# Patient Record
Sex: FEMALE | Race: WHITE | Employment: FULL TIME | URBAN - METROPOLITAN AREA
[De-identification: names, ages, dates, MRNs, and addresses within clinical notes are randomized per-mention and may not be internally consistent; named-entity substitution may affect disease eponyms.]

---

## 2020-10-23 ENCOUNTER — HOSPITAL ENCOUNTER (EMERGENCY)
Age: 30
Discharge: HOME | End: 2020-10-23
Attending: EMERGENCY MEDICINE
Payer: MEDICAID

## 2020-10-23 VITALS
BODY MASS INDEX: 35.36 KG/M2 | RESPIRATION RATE: 16 BRPM | OXYGEN SATURATION: 96 % | WEIGHT: 220 LBS | DIASTOLIC BLOOD PRESSURE: 79 MMHG | TEMPERATURE: 98.5 F | HEIGHT: 66 IN | HEART RATE: 73 BPM | SYSTOLIC BLOOD PRESSURE: 122 MMHG

## 2020-10-23 DIAGNOSIS — R31.9 URINARY TRACT INFECTION WITH HEMATURIA, SITE UNSPECIFIED: Primary | ICD-10-CM

## 2020-10-23 DIAGNOSIS — N39.0 URINARY TRACT INFECTION WITH HEMATURIA, SITE UNSPECIFIED: Primary | ICD-10-CM

## 2020-10-23 DIAGNOSIS — N92.0 MENORRHAGIA WITH REGULAR CYCLE: ICD-10-CM

## 2020-10-23 LAB
ALBUMIN SERPL-MCNC: 4.1 G/DL (ref 3.5–5)
ALBUMIN/GLOB SERPL: 1.1 {RATIO} (ref 1.1–2.2)
ALP SERPL-CCNC: 59 U/L (ref 45–117)
ALT SERPL-CCNC: 182 U/L (ref 12–78)
ANION GAP SERPL CALC-SCNC: 10 MMOL/L (ref 5–15)
APPEARANCE UR: CLEAR
AST SERPL W P-5'-P-CCNC: 70 U/L (ref 15–37)
BACTERIA URNS QL MICRO: ABNORMAL /HPF
BASOPHILS # BLD: 0 K/UL (ref 0–0.1)
BASOPHILS NFR BLD: 0 % (ref 0–1)
BILIRUB SERPL-MCNC: 1 MG/DL (ref 0.2–1)
BILIRUB UR QL: ABNORMAL
BUN SERPL-MCNC: 13 MG/DL (ref 6–20)
BUN/CREAT SERPL: 18 (ref 12–20)
CA-I BLD-MCNC: 8.9 MG/DL (ref 8.5–10.1)
CHLORIDE SERPL-SCNC: 103 MMOL/L (ref 97–108)
CO2 SERPL-SCNC: 26 MMOL/L (ref 21–32)
COLOR UR: ABNORMAL
CREAT SERPL-MCNC: 0.72 MG/DL (ref 0.55–1.02)
DIFFERENTIAL METHOD BLD: ABNORMAL
EOSINOPHIL # BLD: 0.1 K/UL (ref 0–0.4)
EOSINOPHIL NFR BLD: 1 % (ref 0–7)
EPITH CASTS URNS QL MICRO: ABNORMAL /LPF
ERYTHROCYTE [DISTWIDTH] IN BLOOD BY AUTOMATED COUNT: 11.1 % (ref 11.5–14.5)
GLOBULIN SER CALC-MCNC: 3.9 G/DL (ref 2–4)
GLUCOSE SERPL-MCNC: 85 MG/DL (ref 65–100)
GLUCOSE UR STRIP.AUTO-MCNC: NEGATIVE MG/DL
HCG UR QL: NEGATIVE
HCT VFR BLD AUTO: 36.8 % (ref 35–47)
HGB BLD-MCNC: 13.1 G/DL (ref 11.5–16)
HGB UR QL STRIP: ABNORMAL
IMM GRANULOCYTES # BLD AUTO: 0 K/UL (ref 0–0.04)
IMM GRANULOCYTES NFR BLD AUTO: 0 % (ref 0–0.5)
KETONES UR QL STRIP.AUTO: >80 MG/DL
LEUKOCYTE ESTERASE UR QL STRIP.AUTO: ABNORMAL
LYMPHOCYTES # BLD: 1.4 K/UL (ref 0.8–3.5)
LYMPHOCYTES NFR BLD: 22 % (ref 12–49)
MCH RBC QN AUTO: 32.9 PG (ref 26–34)
MCHC RBC AUTO-ENTMCNC: 35.6 G/DL (ref 30–36.5)
MCV RBC AUTO: 92.5 FL (ref 80–99)
MONOCYTES # BLD: 0.5 K/UL (ref 0–1)
MONOCYTES NFR BLD: 7 % (ref 5–13)
NEUTS SEG # BLD: 4.4 K/UL (ref 1.8–8)
NEUTS SEG NFR BLD: 70 % (ref 32–75)
NITRITE UR QL STRIP.AUTO: POSITIVE
PH UR STRIP: 5 [PH] (ref 5–8)
PLATELET # BLD AUTO: 157 K/UL (ref 150–400)
PMV BLD AUTO: 11.5 FL (ref 8.9–12.9)
POTASSIUM SERPL-SCNC: 3.8 MMOL/L (ref 3.5–5.1)
PROT SERPL-MCNC: 8 G/DL (ref 6.4–8.2)
PROT UR STRIP-MCNC: ABNORMAL MG/DL
RBC # BLD AUTO: 3.98 M/UL (ref 3.8–5.2)
RBC #/AREA URNS HPF: ABNORMAL /HPF (ref 0–5)
SODIUM SERPL-SCNC: 139 MMOL/L (ref 136–145)
SP GR UR REFRACTOMETRY: 1.02 (ref 1–1.03)
UA: UC IF INDICATED,UAUC: ABNORMAL
UROBILINOGEN UR QL STRIP.AUTO: 1 EU/DL (ref 0.2–1)
WBC # BLD AUTO: 6.3 K/UL (ref 3.6–11)
WBC URNS QL MICRO: ABNORMAL /HPF (ref 0–4)

## 2020-10-23 PROCEDURE — 80053 COMPREHEN METABOLIC PANEL: CPT

## 2020-10-23 PROCEDURE — 81001 URINALYSIS AUTO W/SCOPE: CPT

## 2020-10-23 PROCEDURE — 81025 URINE PREGNANCY TEST: CPT

## 2020-10-23 PROCEDURE — 87086 URINE CULTURE/COLONY COUNT: CPT

## 2020-10-23 PROCEDURE — 87077 CULTURE AEROBIC IDENTIFY: CPT

## 2020-10-23 PROCEDURE — 99283 EMERGENCY DEPT VISIT LOW MDM: CPT

## 2020-10-23 PROCEDURE — 87186 SC STD MICRODIL/AGAR DIL: CPT

## 2020-10-23 PROCEDURE — 85025 COMPLETE CBC W/AUTO DIFF WBC: CPT

## 2020-10-23 RX ORDER — BUPRENORPHINE AND NALOXONE 8; 2 MG/1; MG/1
1 FILM, SOLUBLE BUCCAL; SUBLINGUAL DAILY
COMMUNITY

## 2020-10-23 RX ORDER — DEXTROAMPHETAMINE SACCHARATE, AMPHETAMINE ASPARTATE, DEXTROAMPHETAMINE SULFATE AND AMPHETAMINE SULFATE 5; 5; 5; 5 MG/1; MG/1; MG/1; MG/1
20 TABLET ORAL 3 TIMES DAILY
COMMUNITY

## 2020-10-23 RX ORDER — CEPHALEXIN 500 MG/1
500 CAPSULE ORAL 4 TIMES DAILY
Qty: 28 CAP | Refills: 0 | Status: SHIPPED | OUTPATIENT
Start: 2020-10-23 | End: 2020-10-30

## 2020-10-23 NOTE — DISCHARGE INSTRUCTIONS
Take your medicines as prescribed and follow-up with your PCP/OB/GYN in 2 to 3 days for further evaluation

## 2020-10-23 NOTE — ED TRIAGE NOTES
PT TO ED WITH C/O HEAVY VAGINAL BLEEDING THAT STARTED YESTERDAY. LMP 9/18/2020. VOMITED X1 TODAY. CRAMPING LOWER ABDOMEN/PEVIC REGION.

## 2020-10-24 NOTE — ED PROVIDER NOTES
EMERGENCY DEPARTMENT HISTORY AND PHYSICAL EXAM      Date: 10/23/2020  Patient Name: Kathryn Correa    History of Presenting Illness     Chief Complaint   Patient presents with    Vaginal Bleeding       History Provided By: Patient    HPI: Kathryn Correa, 35 y.o. female with a past medical history significant Scoliosis, ADHD, h/o opioid dependence presents to the ED with cc of recurrent menorrhagia for the last few days, no relieving factors. She has mild associated dysmenorrhea. No other constitutional symptoms. She has had 1 episode of vomiting. LMP 9/18/2020    There are no other complaints, changes, or physical findings at this time. PCP: UNKNOWN    No current facility-administered medications on file prior to encounter. Current Outpatient Medications on File Prior to Encounter   Medication Sig Dispense Refill    dextroamphetamine-amphetamine (ADDERALL) 20 mg tablet Take 20 mg by mouth three (3) times daily. Indications: attention deficit disorder with hyperactivity      buprenorphine-naloxone (Suboxone) 8-2 mg film sublingaul film 1 Film by SubLINGual route daily. Indications: dependence on opioid-type drugs         Past History     Past Medical History:  Past Medical History:   Diagnosis Date    Scoliosis        Past Surgical History:  History reviewed. No pertinent surgical history. Family History:  History reviewed. No pertinent family history. Social History:  Social History     Tobacco Use    Smoking status: Former Smoker     Types: Cigarettes    Smokeless tobacco: Never Used    Tobacco comment: QUIT 3 YEARS AGO   Substance Use Topics    Alcohol use: Yes    Drug use: No       Allergies:  No Known Allergies      Review of Systems     Review of Systems   Constitutional: Negative. HENT: Negative. Eyes: Negative. Respiratory: Negative. Cardiovascular: Negative. Gastrointestinal: Negative. Endocrine: Negative.     Genitourinary: Positive for menstrual problem and vaginal bleeding. Musculoskeletal: Negative. Skin: Negative. Neurological: Negative. Hematological: Negative. Psychiatric/Behavioral: Negative. All other systems reviewed and are negative. Physical Exam     Physical Exam  Vitals signs and nursing note reviewed. Constitutional:       Appearance: Normal appearance. She is normal weight. HENT:      Head: Normocephalic and atraumatic. Nose: Nose normal.      Mouth/Throat:      Mouth: Mucous membranes are moist.      Pharynx: Oropharynx is clear. Eyes:      Extraocular Movements: Extraocular movements intact. Conjunctiva/sclera: Conjunctivae normal.      Pupils: Pupils are equal, round, and reactive to light. Neck:      Musculoskeletal: Normal range of motion and neck supple. Cardiovascular:      Rate and Rhythm: Normal rate and regular rhythm. Pulses: Normal pulses. Heart sounds: Normal heart sounds. Pulmonary:      Effort: Pulmonary effort is normal.      Breath sounds: Normal breath sounds. Abdominal:      General: Abdomen is flat. Palpations: Abdomen is soft. Musculoskeletal: Normal range of motion. Skin:     General: Skin is warm and dry. Capillary Refill: Capillary refill takes less than 2 seconds. Neurological:      General: No focal deficit present. Mental Status: She is alert and oriented to person, place, and time.    Psychiatric:         Mood and Affect: Mood normal.         Behavior: Behavior normal.         Diagnostic Study Results     Labs -  Recent Results (from the past 24 hour(s))   URINALYSIS W/ REFLEX CULTURE    Collection Time: 10/23/20  3:00 PM    Specimen: Urine   Result Value Ref Range    Color Dark Yellow      Appearance Clear Clear      Specific gravity 1.020 1.003 - 1.030      pH (UA) 5.0 5.0 - 8.0      Protein Trace (A) Negative mg/dL    Glucose Negative Negative mg/dL    Ketone >80 (A) Negative mg/dL    Bilirubin Small (A) Negative      Blood Large (A) Negative Urobilinogen 1.0 0.2 - 1.0 EU/dL    Nitrites Positive (A) Negative      Leukocyte Esterase Large (A) Negative      WBC 10-20 0 - 4 /hpf    RBC 5-10 0 - 5 /hpf    Epithelial cells Moderate (A) Few /lpf    Bacteria 2+ (A) Negative /hpf    UA:UC IF INDICATED Urine Culture Ordered (A) Culture not indicated by UA result     HCG URINE, QL    Collection Time: 10/23/20  3:00 PM   Result Value Ref Range    HCG urine, QL Negative Negative     CBC WITH AUTOMATED DIFF    Collection Time: 10/23/20  3:30 PM   Result Value Ref Range    WBC 6.3 3.6 - 11.0 K/uL    RBC 3.98 3.80 - 5.20 M/uL    HGB 13.1 11.5 - 16.0 g/dL    HCT 36.8 35.0 - 47.0 %    MCV 92.5 80.0 - 99.0 FL    MCH 32.9 26.0 - 34.0 PG    MCHC 35.6 30.0 - 36.5 g/dL    RDW 11.1 (L) 11.5 - 14.5 %    PLATELET 357 607 - 438 K/uL    MPV 11.5 8.9 - 12.9 FL    NEUTROPHILS 70 32 - 75 %    LYMPHOCYTES 22 12 - 49 %    MONOCYTES 7 5 - 13 %    EOSINOPHILS 1 0 - 7 %    BASOPHILS 0 0 - 1 %    IMMATURE GRANULOCYTES 0 0.0 - 0.5 %    ABS. NEUTROPHILS 4.4 1.8 - 8.0 K/UL    ABS. LYMPHOCYTES 1.4 0.8 - 3.5 K/UL    ABS. MONOCYTES 0.5 0.0 - 1.0 K/UL    ABS. EOSINOPHILS 0.1 0.0 - 0.4 K/UL    ABS. BASOPHILS 0.0 0.0 - 0.1 K/UL    ABS. IMM. GRANS. 0.0 0.00 - 0.04 K/UL    DF AUTOMATED     METABOLIC PANEL, COMPREHENSIVE    Collection Time: 10/23/20  3:30 PM   Result Value Ref Range    Sodium 139 136 - 145 mmol/L    Potassium 3.8 3.5 - 5.1 mmol/L    Chloride 103 97 - 108 mmol/L    CO2 26 21 - 32 mmol/L    Anion gap 10 5 - 15 mmol/L    Glucose 85 65 - 100 mg/dL    BUN 13 6 - 20 mg/dL    Creatinine 0.72 0.55 - 1.02 mg/dL    BUN/Creatinine ratio 18 12 - 20      GFR est AA >60 >60 ml/min/1.73m2    GFR est non-AA >60 >60 ml/min/1.73m2    Calcium 8.9 8.5 - 10.1 mg/dL    Bilirubin, total 1.0 0.2 - 1.0 mg/dL    AST (SGOT) 70 (H) 15 - 37 U/L    ALT (SGPT) 182 (H) 12 - 78 U/L    Alk.  phosphatase 59 45 - 117 U/L    Protein, total 8.0 6.4 - 8.2 g/dL    Albumin 4.1 3.5 - 5.0 g/dL    Globulin 3.9 2.0 - 4.0 g/dL A-G Ratio 1.1 1.1 - 2.2       Radiologic Studies -     CT Results  (Last 48 hours)    None        CXR Results  (Last 48 hours)    None            Medical Decision Making   I am the first provider for this patient. I reviewed the vital signs, available nursing notes, past medical history, past surgical history, family history and social history. Vital Signs-Reviewed the patient's vital signs. No data found. Records Reviewed: Nursing Notes    Provider Notes (Medical Decision Making): Uneventful ED course, clinical improvement with therapy, patient will be discharged to followup with PCP as directed      ED Course:   Initial assessment performed. The patients presenting problems have been discussed, and they are in agreement with the care plan formulated and outlined with them. I have encouraged them to ask questions as they arise throughout their visit. PLAN:  1. Discharge Medication List as of 10/23/2020  4:58 PM      START taking these medications    Details   cephALEXin (Keflex) 500 mg capsule Take 1 Cap by mouth four (4) times daily for 7 days. , Normal, Disp-28 Cap,R-0         CONTINUE these medications which have NOT CHANGED    Details   dextroamphetamine-amphetamine (ADDERALL) 20 mg tablet Take 20 mg by mouth three (3) times daily. Indications: attention deficit disorder with hyperactivity, Historical Med      buprenorphine-naloxone (Suboxone) 8-2 mg film sublingaul film 1 Film by SubLINGual route daily. Indications: dependence on opioid-type drugs, Historical Med.           2.   Follow-up Information     Follow up With Specialties Details Why Contact Info    Follow-up PCP/OB/GYN of your choice  In 2 days For further evaluation         Return to ED if worse     Diagnosis     Clinical Impression:   1. Urinary tract infection with hematuria, site unspecified    2.  Menorrhagia with regular cycle      Patients ED visit today does not show an emergent medical condition that requires inpatient admission; The diagnosis, treatment plan and the need for follow up have been discussed in detail with the patient and he has been given the opportunity to ask questions. All such questions at this time, have been satisfactorily answered; patient remains in stable condition for discharge.

## 2020-10-26 LAB
BACTERIA SPEC CULT: ABNORMAL
BACTERIA SPEC CULT: ABNORMAL
COLONY COUNT,CNT: ABNORMAL
SPECIAL REQUESTS,SREQ: ABNORMAL

## 2023-12-04 LAB
ABO, EXTERNAL RESULT: NORMAL
C. TRACHOMATIS, EXTERNAL RESULT: NEGATIVE
HEP B, EXTERNAL RESULT: NEGATIVE
HIV, EXTERNAL RESULT: NON REACTIVE
N. GONORRHOEAE, EXTERNAL RESULT: NEGATIVE
RH FACTOR, EXTERNAL RESULT: POSITIVE
RPR, EXTERNAL RESULT: NON REACTIVE
RUBELLA TITER, EXTERNAL RESULT: NORMAL

## 2024-02-15 ENCOUNTER — TELEPHONE (OUTPATIENT)
Age: 34
End: 2024-02-15

## 2024-02-15 NOTE — TELEPHONE ENCOUNTER
Called [facility] and spoke with medical records. She informed me that her labs have not came back yet, but she is going to fax us her most recent office note for now.

## 2024-02-23 ENCOUNTER — TELEPHONE (OUTPATIENT)
Age: 34
End: 2024-02-23

## 2024-02-23 NOTE — TELEPHONE ENCOUNTER
LVM to reschedule missed Hematology Consult    Per team  3/4/24 2:00 or 3:00    Np / anemia - 21 wks pregnant / Hannah mcdonough  per team

## 2024-03-04 ENCOUNTER — OFFICE VISIT (OUTPATIENT)
Age: 34
End: 2024-03-04
Payer: MEDICAID

## 2024-03-04 VITALS
BODY MASS INDEX: 40.98 KG/M2 | WEIGHT: 246 LBS | TEMPERATURE: 97.8 F | SYSTOLIC BLOOD PRESSURE: 118 MMHG | RESPIRATION RATE: 18 BRPM | DIASTOLIC BLOOD PRESSURE: 82 MMHG | HEIGHT: 65 IN | OXYGEN SATURATION: 92 % | HEART RATE: 100 BPM

## 2024-03-04 DIAGNOSIS — D69.6 THROMBOCYTOPENIA (HCC): ICD-10-CM

## 2024-03-04 DIAGNOSIS — B19.20 HEPATITIS C VIRUS INFECTION WITHOUT HEPATIC COMA, UNSPECIFIED CHRONICITY: ICD-10-CM

## 2024-03-04 DIAGNOSIS — D69.6 THROMBOCYTOPENIA (HCC): Primary | ICD-10-CM

## 2024-03-04 PROCEDURE — 99204 OFFICE O/P NEW MOD 45 MIN: CPT | Performed by: INTERNAL MEDICINE

## 2024-03-04 NOTE — PROGRESS NOTES
[facility] [place] [facility]  [address], [address] [place] [facility] [place] [place] [address]  W: [phone]  F: [phone] Patient ID  Name: Brylee M McFadden  YOB: 1990  MRN: [id number]  Referring Provider:   No referring provider defined for this encounter.  Primary Care Provider:   Mahesh Klein MD       HEMATOLOGY/MEDICAL ONCOLOGY  NOTE     Reason for Evaluation:     Chief Complaint   Patient presents with    New Patient     Iron deficiency/anemia     Subjective:     History of Present Illness:   Date of Visit: 03/04/24   Brylee M McFadden is a 33 y.o. female who presents for an initial evaluation for THROMBOCYTOPENIA.  She is at 24 weeks pregnant. She is referred to Hematolgoy clinic for MILD THROMBOCYTOPENIA.  This is a new problem.  Patient reports doing well overall.    Patient denies any shortness of breath.  Patient denies any gait disturbance.    Denies any bleeding.  Denies any cravings to eat ice.      Past Medical History:   Diagnosis Date    Scoliosis       No past surgical history on file.   Social History     Tobacco Use    Smoking status: Former    Smokeless tobacco: Never    Tobacco comments:     Quit smoking: QUIT 3 YEARS AGO   Substance Use Topics    Alcohol use: Yes      No family history on file.  Current Outpatient Medications   Medication Sig    amphetamine-dextroamphetamine (ADDERALL) 20 MG tablet Take 1 tablet by mouth 3 times daily.    buprenorphine-naloxone (SUBOXONE) 8-2 MG FILM SL film Place 1 Film under the tongue daily.     No current facility-administered medications for this visit.      No Known Allergies   -  Review of Systems provided by:patient  General: denies fever and denies chills  Eyes: denies any acute vision loss and denies any eye pain  HEENT: denies epistaxis and denies trouble swallowing  Cardio: denies any chest pain and denies any leg swelling  Resp: denies any shortness of breath. denies any hemoptysis.  Abdomen: denies any

## 2024-03-06 LAB
ALBUMIN SERPL-MCNC: 3.5 G/DL (ref 3.5–5)
ALBUMIN/GLOB SERPL: 0.9 (ref 1.1–2.2)
ALP SERPL-CCNC: 49 U/L (ref 45–117)
ALT SERPL-CCNC: 42 U/L (ref 12–78)
AST SERPL-CCNC: 26 U/L (ref 15–37)
BASOPHILS # BLD: 0 K/UL (ref 0–0.1)
BASOPHILS NFR BLD: 0 % (ref 0–1)
BILIRUB DIRECT SERPL-MCNC: 0.2 MG/DL (ref 0–0.2)
BILIRUB SERPL-MCNC: 0.5 MG/DL (ref 0.2–1)
DIFFERENTIAL METHOD BLD: ABNORMAL
EOSINOPHIL # BLD: 0 K/UL (ref 0–0.4)
EOSINOPHIL NFR BLD: 0 % (ref 0–7)
ERYTHROCYTE [DISTWIDTH] IN BLOOD BY AUTOMATED COUNT: 13.1 % (ref 11.5–14.5)
GLOBULIN SER CALC-MCNC: 3.7 G/DL (ref 2–4)
HAPTOGLOB SERPL-MCNC: 30 MG/DL (ref 30–200)
HBV CORE AB SERPL QL IA: NEGATIVE
HBV SURFACE AB SER QL: REACTIVE
HBV SURFACE AB SER-ACNC: 366.89 MIU/ML
HBV SURFACE AG SER QL: 0.37 INDEX
HBV SURFACE AG SER QL: NEGATIVE
HCT VFR BLD AUTO: 35.4 % (ref 35–47)
HGB BLD-MCNC: 11.5 G/DL (ref 11.5–16)
HIV 1+2 AB+HIV1 P24 AG SERPL QL IA: NONREACTIVE
HIV 1/2 RESULT COMMENT: NORMAL
IMM GRANULOCYTES # BLD AUTO: 0 K/UL (ref 0–0.04)
IMM GRANULOCYTES NFR BLD AUTO: 0 % (ref 0–0.5)
LDH SERPL L TO P-CCNC: 156 U/L (ref 81–246)
LYMPHOCYTES # BLD: 0.9 K/UL (ref 0.8–3.5)
LYMPHOCYTES NFR BLD: 14 % (ref 12–49)
MCH RBC QN AUTO: 32.5 PG (ref 26–34)
MCHC RBC AUTO-ENTMCNC: 32.5 G/DL (ref 30–36.5)
MCV RBC AUTO: 100 FL (ref 80–99)
MONOCYTES # BLD: 0.2 K/UL (ref 0–1)
MONOCYTES NFR BLD: 3 % (ref 5–13)
NEUTS SEG # BLD: 5.7 K/UL (ref 1.8–8)
NEUTS SEG NFR BLD: 83 % (ref 32–75)
NRBC # BLD: 0 K/UL (ref 0–0.01)
NRBC BLD-RTO: 0 PER 100 WBC
PERIPHERAL SMEAR, MD REVIEW: NORMAL
PLATELET # BLD AUTO: 140 K/UL (ref 150–400)
PMV BLD AUTO: 13 FL (ref 8.9–12.9)
PROT SERPL-MCNC: 7.2 G/DL (ref 6.4–8.2)
RBC # BLD AUTO: 3.54 M/UL (ref 3.8–5.2)
VIT B12 SERPL-MCNC: 386 PG/ML (ref 193–986)
WBC # BLD AUTO: 6.9 K/UL (ref 3.6–11)

## 2024-03-08 LAB — FOLATE SERPL-MCNC: >20 NG/ML

## 2024-04-17 ENCOUNTER — TELEPHONE (OUTPATIENT)
Age: 34
End: 2024-04-17

## 2024-05-08 LAB — HEPATITIS C ANTIBODY, EXTERNAL RESULT: REACTIVE

## 2024-06-20 ENCOUNTER — HOSPITAL ENCOUNTER (INPATIENT)
Facility: HOSPITAL | Age: 34
LOS: 3 days | Discharge: HOME | DRG: 560 | End: 2024-06-23
Attending: OBSTETRICS & GYNECOLOGY | Admitting: STUDENT IN AN ORGANIZED HEALTH CARE EDUCATION/TRAINING PROGRAM
Payer: MEDICAID

## 2024-06-20 PROBLEM — Z34.90 TERM PREGNANCY: Status: ACTIVE | Noted: 2024-06-20

## 2024-06-20 LAB
ALBUMIN SERPL-MCNC: 2.8 G/DL (ref 3.5–5)
ALBUMIN/GLOB SERPL: 0.7 (ref 1.1–2.2)
ALP SERPL-CCNC: 109 U/L (ref 45–117)
ALT SERPL-CCNC: 54 U/L (ref 12–78)
AMPHET UR QL SCN: NEGATIVE
ANION GAP SERPL CALC-SCNC: 8 MMOL/L (ref 5–15)
AST SERPL-CCNC: 42 U/L (ref 15–37)
BARBITURATES UR QL SCN: NEGATIVE
BENZODIAZ UR QL: POSITIVE
BILIRUB SERPL-MCNC: 0.5 MG/DL (ref 0.2–1)
BUN SERPL-MCNC: 5 MG/DL (ref 6–20)
BUN/CREAT SERPL: 7 (ref 12–20)
CALCIUM SERPL-MCNC: 9.1 MG/DL (ref 8.5–10.1)
CANNABINOIDS UR QL SCN: POSITIVE
CHLORIDE SERPL-SCNC: 107 MMOL/L (ref 97–108)
CO2 SERPL-SCNC: 22 MMOL/L (ref 21–32)
COCAINE UR QL SCN: NEGATIVE
CREAT SERPL-MCNC: 0.68 MG/DL (ref 0.55–1.02)
ERYTHROCYTE [DISTWIDTH] IN BLOOD BY AUTOMATED COUNT: 12.8 % (ref 11.5–14.5)
GLOBULIN SER CALC-MCNC: 3.9 G/DL (ref 2–4)
GLUCOSE SERPL-MCNC: 84 MG/DL (ref 65–100)
HCT VFR BLD AUTO: 33 % (ref 35–47)
HGB BLD-MCNC: 11.8 G/DL (ref 11.5–16)
Lab: ABNORMAL
MCH RBC QN AUTO: 32.2 PG (ref 26–34)
MCHC RBC AUTO-ENTMCNC: 35.8 G/DL (ref 30–36.5)
MCV RBC AUTO: 89.9 FL (ref 80–99)
METHADONE UR QL: NEGATIVE
NRBC # BLD: 0 K/UL (ref 0–0.01)
NRBC BLD-RTO: 0 PER 100 WBC
OPIATES UR QL: NEGATIVE
PCP UR QL: NEGATIVE
PLATELET # BLD AUTO: 124 K/UL (ref 150–400)
PMV BLD AUTO: 11.8 FL (ref 8.9–12.9)
POTASSIUM SERPL-SCNC: 4.1 MMOL/L (ref 3.5–5.1)
PROT SERPL-MCNC: 6.7 G/DL (ref 6.4–8.2)
RBC # BLD AUTO: 3.67 M/UL (ref 3.8–5.2)
SODIUM SERPL-SCNC: 137 MMOL/L (ref 136–145)
WBC # BLD AUTO: 6.8 K/UL (ref 3.6–11)

## 2024-06-20 PROCEDURE — 3E0DXGC INTRODUCTION OF OTHER THERAPEUTIC SUBSTANCE INTO MOUTH AND PHARYNX, EXTERNAL APPROACH: ICD-10-PCS | Performed by: STUDENT IN AN ORGANIZED HEALTH CARE EDUCATION/TRAINING PROGRAM

## 2024-06-20 PROCEDURE — 86850 RBC ANTIBODY SCREEN: CPT

## 2024-06-20 PROCEDURE — 80307 DRUG TEST PRSMV CHEM ANLYZR: CPT

## 2024-06-20 PROCEDURE — 59200 INSERT CERVICAL DILATOR: CPT | Performed by: OBSTETRICS & GYNECOLOGY

## 2024-06-20 PROCEDURE — 7220000101 HC DELIVERY VAGINAL/SINGLE: Performed by: OBSTETRICS & GYNECOLOGY

## 2024-06-20 PROCEDURE — 86780 TREPONEMA PALLIDUM: CPT

## 2024-06-20 PROCEDURE — 85027 COMPLETE CBC AUTOMATED: CPT

## 2024-06-20 PROCEDURE — 4A1HXCZ MONITORING OF PRODUCTS OF CONCEPTION, CARDIAC RATE, EXTERNAL APPROACH: ICD-10-PCS | Performed by: STUDENT IN AN ORGANIZED HEALTH CARE EDUCATION/TRAINING PROGRAM

## 2024-06-20 PROCEDURE — 36415 COLL VENOUS BLD VENIPUNCTURE: CPT

## 2024-06-20 PROCEDURE — 7210000100 HC LABOR FEE PER 1 HR: Performed by: OBSTETRICS & GYNECOLOGY

## 2024-06-20 PROCEDURE — 86900 BLOOD TYPING SEROLOGIC ABO: CPT

## 2024-06-20 PROCEDURE — 3700000156 HC EPIDURAL ANESTHESIA: Performed by: OBSTETRICS & GYNECOLOGY

## 2024-06-20 PROCEDURE — 1100000000 HC RM PRIVATE

## 2024-06-20 PROCEDURE — 80053 COMPREHEN METABOLIC PANEL: CPT

## 2024-06-20 PROCEDURE — 6370000000 HC RX 637 (ALT 250 FOR IP): Performed by: OBSTETRICS & GYNECOLOGY

## 2024-06-20 PROCEDURE — 86901 BLOOD TYPING SEROLOGIC RH(D): CPT

## 2024-06-20 RX ORDER — TERBUTALINE SULFATE 1 MG/ML
0.25 INJECTION, SOLUTION SUBCUTANEOUS
Status: ACTIVE | OUTPATIENT
Start: 2024-06-20 | End: 2024-06-21

## 2024-06-20 RX ORDER — DOCUSATE SODIUM 100 MG/1
100 CAPSULE, LIQUID FILLED ORAL 2 TIMES DAILY
Status: DISCONTINUED | OUTPATIENT
Start: 2024-06-20 | End: 2024-06-21 | Stop reason: SDUPTHER

## 2024-06-20 RX ORDER — SODIUM CHLORIDE 0.9 % (FLUSH) 0.9 %
5-40 SYRINGE (ML) INJECTION EVERY 12 HOURS SCHEDULED
Status: DISCONTINUED | OUTPATIENT
Start: 2024-06-20 | End: 2024-06-23 | Stop reason: HOSPADM

## 2024-06-20 RX ORDER — ONDANSETRON 4 MG/1
4 TABLET, ORALLY DISINTEGRATING ORAL EVERY 6 HOURS PRN
Status: DISCONTINUED | OUTPATIENT
Start: 2024-06-20 | End: 2024-06-23 | Stop reason: HOSPADM

## 2024-06-20 RX ORDER — TRANEXAMIC ACID 10 MG/ML
1000 INJECTION, SOLUTION INTRAVENOUS
Status: ACTIVE | OUTPATIENT
Start: 2024-06-20 | End: 2024-06-21

## 2024-06-20 RX ORDER — SODIUM CHLORIDE, SODIUM LACTATE, POTASSIUM CHLORIDE, AND CALCIUM CHLORIDE .6; .31; .03; .02 G/100ML; G/100ML; G/100ML; G/100ML
500 INJECTION, SOLUTION INTRAVENOUS PRN
Status: DISCONTINUED | OUTPATIENT
Start: 2024-06-20 | End: 2024-06-23 | Stop reason: HOSPADM

## 2024-06-20 RX ORDER — MISOPROSTOL 200 UG/1
400 TABLET ORAL PRN
Status: DISCONTINUED | OUTPATIENT
Start: 2024-06-20 | End: 2024-06-23 | Stop reason: HOSPADM

## 2024-06-20 RX ORDER — SODIUM CHLORIDE 9 MG/ML
25 INJECTION, SOLUTION INTRAVENOUS PRN
Status: DISCONTINUED | OUTPATIENT
Start: 2024-06-20 | End: 2024-06-23 | Stop reason: HOSPADM

## 2024-06-20 RX ORDER — METHYLERGONOVINE MALEATE 0.2 MG/ML
200 INJECTION INTRAVENOUS PRN
Status: DISCONTINUED | OUTPATIENT
Start: 2024-06-20 | End: 2024-06-23 | Stop reason: HOSPADM

## 2024-06-20 RX ORDER — ONDANSETRON 2 MG/ML
4 INJECTION INTRAMUSCULAR; INTRAVENOUS EVERY 6 HOURS PRN
Status: DISCONTINUED | OUTPATIENT
Start: 2024-06-20 | End: 2024-06-23 | Stop reason: HOSPADM

## 2024-06-20 RX ORDER — SODIUM CHLORIDE, SODIUM LACTATE, POTASSIUM CHLORIDE, CALCIUM CHLORIDE 600; 310; 30; 20 MG/100ML; MG/100ML; MG/100ML; MG/100ML
INJECTION, SOLUTION INTRAVENOUS CONTINUOUS
Status: DISCONTINUED | OUTPATIENT
Start: 2024-06-20 | End: 2024-06-23 | Stop reason: HOSPADM

## 2024-06-20 RX ORDER — SODIUM CHLORIDE 0.9 % (FLUSH) 0.9 %
5-40 SYRINGE (ML) INJECTION PRN
Status: DISCONTINUED | OUTPATIENT
Start: 2024-06-20 | End: 2024-06-23 | Stop reason: HOSPADM

## 2024-06-20 RX ORDER — CARBOPROST TROMETHAMINE 250 UG/ML
250 INJECTION, SOLUTION INTRAMUSCULAR PRN
Status: DISCONTINUED | OUTPATIENT
Start: 2024-06-20 | End: 2024-06-23 | Stop reason: HOSPADM

## 2024-06-20 RX ADMIN — Medication 50 MCG: at 18:38

## 2024-06-20 NOTE — H&P
History & Physical    Name: Brylee M McFadden MRN: [id number]  SSN: [id number]    YOB: 1990  Age: 33 y.o.  Sex: female      Subjective:     Chief Complaint:  Pregnancy     Estimated Date of Delivery: 24  OB History    Para Term  AB Living   4 1 1 0 2 1   SAB IAB Ectopic Molar Multiple Live Births   1 1 0 0 0 1      # Outcome Date GA Lbr Manuel/2nd Weight Sex Delivery Anes PTL Lv   4 Current            3 Term 17 40w0d   F Vag-Spont EPI  RAMSES   2 IAB            1 SAB                Ms. Luna is admitted with pregnancy at 39w0d for induction of labor due to childcare issues. Prenatal course was complicated by late transfer of care, subutex use, HCV, scoliosis, likely ITP (low platelets never requiring intervention), anx/depression.  Please see prenatal records which have also been sent to Labor and Delivery and added to Epic for details.    Past Medical History:   Diagnosis Date    Scoliosis      No past surgical history on file.  Social History     Occupational History    Not on file   Tobacco Use    Smoking status: Former    Smokeless tobacco: Never    Tobacco comments:     Quit smoking: QUIT 3 YEARS AGO   Substance and Sexual Activity    Alcohol use: Yes    Drug use: No    Sexual activity: Not on file     Comment: IUD REMOVED SEPTEMBER     No family history on file.    No Known Allergies  Prior to Admission medications    Medication Sig Start Date End Date Taking? Authorizing Provider   amphetamine-dextroamphetamine (ADDERALL) 20 MG tablet Take 1 tablet by mouth 3 times daily.    Automatic Reconciliation, Ar   buprenorphine-naloxone (SUBOXONE) 8-2 MG FILM SL film Place 1 Film under the tongue daily.    Automatic Reconciliation, Ar        Review of Systems: A comprehensive review of systems was negative except for that written in the History of Present Illness.    Objective:     Vitals:  There were no vitals filed for this visit.      LMP 2023       Lungs:   Respiratory

## 2024-06-20 NOTE — PROGRESS NOTES
1900: Bedside and Verbal shift change report given to Vianey RN  (oncoming nurse) by Hedy RN  (offgoing nurse). Report included the following information Nurse Handoff Report, Adult Overview, MAR, Recent Results, Quality Measures, and Event Log.  Care assumed at this time.     2255: Cook out at this time.     2305: SVE per this RN 5/70/-3.     2311: This RN calling Unruly OLMSTEAD to update on pt condition and review POC. MD reviewing EFM/ TOCO tracing at this time. MD d/c Miso and place pit order if ctx space out. Pt allowed to ambulate and intermittent monitor EFM.    0422: This RN measuring arm and switching BP cuff.     0623: Bedside and Verbal shift change report given to Mahogany RN  (oncoming nurse) by Vianey RN  (offgoing nurse). Report included the following information Nurse Handoff Report, MAR, Quality Measures, and Event Log.  Care handed over at this time.

## 2024-06-20 NOTE — PROGRESS NOTES
1640: patient arrived ambulatory for elective IOL. She denies LOF, vaginal bleeding, HA, blurred vision and RUQ pain. She has had +FM. Oriented pt to room and placed on EFM.    1715: Dr Potter confirmed vertex presentation via US. SVE per MD 2/50/-3. Cook cath 60/60 placed by MD and tolerated by patient. POC discussed with patient to start misoprostol after eating dinner.     1900: Bedside shift change report given to Shira MONTEZ (oncoming nurse) by Robles MONTEZ (offgoing nurse). Report included the following information Nurse Handoff Report, Intake/Output, MAR, Recent Results, and Med Rec Status.

## 2024-06-21 ENCOUNTER — ANESTHESIA (OUTPATIENT)
Facility: HOSPITAL | Age: 34
DRG: 560 | End: 2024-06-21
Payer: MEDICAID

## 2024-06-21 ENCOUNTER — ANESTHESIA EVENT (OUTPATIENT)
Facility: HOSPITAL | Age: 34
DRG: 560 | End: 2024-06-21
Payer: MEDICAID

## 2024-06-21 LAB
ABO + RH BLD: NORMAL
BLOOD GROUP ANTIBODIES SERPL: NORMAL
SPECIMEN EXP DATE BLD: NORMAL

## 2024-06-21 PROCEDURE — 2580000003 HC RX 258: Performed by: OBSTETRICS & GYNECOLOGY

## 2024-06-21 PROCEDURE — 2700000000 HC OXYGEN THERAPY PER DAY

## 2024-06-21 PROCEDURE — 2500000003 HC RX 250 WO HCPCS: Performed by: NURSE ANESTHETIST, CERTIFIED REGISTERED

## 2024-06-21 PROCEDURE — 94761 N-INVAS EAR/PLS OXIMETRY MLT: CPT

## 2024-06-21 PROCEDURE — 6360000002 HC RX W HCPCS: Performed by: OBSTETRICS & GYNECOLOGY

## 2024-06-21 PROCEDURE — 10907ZC DRAINAGE OF AMNIOTIC FLUID, THERAPEUTIC FROM PRODUCTS OF CONCEPTION, VIA NATURAL OR ARTIFICIAL OPENING: ICD-10-PCS | Performed by: STUDENT IN AN ORGANIZED HEALTH CARE EDUCATION/TRAINING PROGRAM

## 2024-06-21 PROCEDURE — 6370000000 HC RX 637 (ALT 250 FOR IP): Performed by: OBSTETRICS & GYNECOLOGY

## 2024-06-21 PROCEDURE — 1100000000 HC RM PRIVATE

## 2024-06-21 PROCEDURE — 6360000002 HC RX W HCPCS: Performed by: NURSE ANESTHETIST, CERTIFIED REGISTERED

## 2024-06-21 PROCEDURE — 00HU33Z INSERTION OF INFUSION DEVICE INTO SPINAL CANAL, PERCUTANEOUS APPROACH: ICD-10-PCS | Performed by: ANESTHESIOLOGY

## 2024-06-21 PROCEDURE — 3700000025 EPIDURAL BLOCK: Performed by: ANESTHESIOLOGY

## 2024-06-21 PROCEDURE — 51702 INSERT TEMP BLADDER CATH: CPT

## 2024-06-21 PROCEDURE — 1120000000 HC RM PRIVATE OB

## 2024-06-21 PROCEDURE — 0HQ9XZZ REPAIR PERINEUM SKIN, EXTERNAL APPROACH: ICD-10-PCS | Performed by: OBSTETRICS & GYNECOLOGY

## 2024-06-21 RX ORDER — SODIUM CHLORIDE 0.9 % (FLUSH) 0.9 %
5-40 SYRINGE (ML) INJECTION EVERY 12 HOURS SCHEDULED
Status: DISCONTINUED | OUTPATIENT
Start: 2024-06-21 | End: 2024-06-23 | Stop reason: HOSPADM

## 2024-06-21 RX ORDER — NALOXONE HYDROCHLORIDE 0.4 MG/ML
INJECTION, SOLUTION INTRAMUSCULAR; INTRAVENOUS; SUBCUTANEOUS PRN
Status: DISCONTINUED | OUTPATIENT
Start: 2024-06-21 | End: 2024-06-23 | Stop reason: HOSPADM

## 2024-06-21 RX ORDER — SODIUM CHLORIDE 9 MG/ML
INJECTION, SOLUTION INTRAVENOUS PRN
Status: DISCONTINUED | OUTPATIENT
Start: 2024-06-21 | End: 2024-06-23 | Stop reason: HOSPADM

## 2024-06-21 RX ORDER — FENTANYL/BUPIVACAINE/NS/PF 2-1250MCG
12 PLASTIC BAG, INJECTION (ML) INJECTION CONTINUOUS
Status: DISCONTINUED | OUTPATIENT
Start: 2024-06-21 | End: 2024-06-23 | Stop reason: HOSPADM

## 2024-06-21 RX ORDER — ONDANSETRON 4 MG/1
4 TABLET, ORALLY DISINTEGRATING ORAL EVERY 6 HOURS PRN
Status: DISCONTINUED | OUTPATIENT
Start: 2024-06-21 | End: 2024-06-23 | Stop reason: HOSPADM

## 2024-06-21 RX ORDER — MISOPROSTOL 200 UG/1
800 TABLET ORAL PRN
Status: DISCONTINUED | OUTPATIENT
Start: 2024-06-21 | End: 2024-06-23 | Stop reason: HOSPADM

## 2024-06-21 RX ORDER — BUPIVACAINE HYDROCHLORIDE 2.5 MG/ML
INJECTION, SOLUTION EPIDURAL; INFILTRATION; INTRACAUDAL PRN
Status: DISCONTINUED | OUTPATIENT
Start: 2024-06-21 | End: 2024-06-21 | Stop reason: SDUPTHER

## 2024-06-21 RX ORDER — ACETAMINOPHEN 500 MG
1000 TABLET ORAL ONCE
Status: COMPLETED | OUTPATIENT
Start: 2024-06-21 | End: 2024-06-21

## 2024-06-21 RX ORDER — DOCUSATE SODIUM 100 MG/1
100 CAPSULE, LIQUID FILLED ORAL 2 TIMES DAILY
Qty: 60 CAPSULE | Refills: 0 | Status: SHIPPED | OUTPATIENT
Start: 2024-06-21 | End: 2024-07-21

## 2024-06-21 RX ORDER — IBUPROFEN 800 MG/1
800 TABLET ORAL EVERY 8 HOURS PRN
Qty: 30 TABLET | Refills: 1 | Status: SHIPPED | OUTPATIENT
Start: 2024-06-21

## 2024-06-21 RX ORDER — ACETAMINOPHEN 500 MG
1000 TABLET ORAL EVERY 8 HOURS SCHEDULED
Status: DISCONTINUED | OUTPATIENT
Start: 2024-06-21 | End: 2024-06-23 | Stop reason: HOSPADM

## 2024-06-21 RX ORDER — IBUPROFEN 800 MG/1
800 TABLET ORAL EVERY 8 HOURS SCHEDULED
Status: DISCONTINUED | OUTPATIENT
Start: 2024-06-21 | End: 2024-06-23 | Stop reason: HOSPADM

## 2024-06-21 RX ORDER — LANOLIN/MINERAL OIL
LOTION (ML) TOPICAL PRN
Status: DISCONTINUED | OUTPATIENT
Start: 2024-06-21 | End: 2024-06-23 | Stop reason: HOSPADM

## 2024-06-21 RX ORDER — LIDOCAINE HYDROCHLORIDE AND EPINEPHRINE 15; 5 MG/ML; UG/ML
INJECTION, SOLUTION EPIDURAL PRN
Status: DISCONTINUED | OUTPATIENT
Start: 2024-06-21 | End: 2024-06-21 | Stop reason: SDUPTHER

## 2024-06-21 RX ORDER — SODIUM CHLORIDE 0.9 % (FLUSH) 0.9 %
5-40 SYRINGE (ML) INJECTION PRN
Status: DISCONTINUED | OUTPATIENT
Start: 2024-06-21 | End: 2024-06-23 | Stop reason: HOSPADM

## 2024-06-21 RX ORDER — EPHEDRINE SULFATE/0.9% NACL/PF 25 MG/5 ML
10 SYRINGE (ML) INTRAVENOUS EVERY 5 MIN PRN
Status: ACTIVE | OUTPATIENT
Start: 2024-06-21 | End: 2024-06-23

## 2024-06-21 RX ORDER — ONDANSETRON 2 MG/ML
4 INJECTION INTRAMUSCULAR; INTRAVENOUS EVERY 6 HOURS PRN
Status: DISCONTINUED | OUTPATIENT
Start: 2024-06-21 | End: 2024-06-23 | Stop reason: HOSPADM

## 2024-06-21 RX ORDER — ACETAMINOPHEN 500 MG
1000 TABLET ORAL EVERY 8 HOURS PRN
Qty: 180 TABLET | Refills: 0 | Status: SHIPPED | OUTPATIENT
Start: 2024-06-21 | End: 2024-07-21

## 2024-06-21 RX ORDER — DOCUSATE SODIUM 100 MG/1
100 CAPSULE, LIQUID FILLED ORAL 2 TIMES DAILY
Status: DISCONTINUED | OUTPATIENT
Start: 2024-06-21 | End: 2024-06-23 | Stop reason: HOSPADM

## 2024-06-21 RX ADMIN — BUPIVACAINE HYDROCHLORIDE 6 ML: 2.5 INJECTION, SOLUTION EPIDURAL; INFILTRATION; INTRACAUDAL; PERINEURAL at 11:20

## 2024-06-21 RX ADMIN — BUPIVACAINE HYDROCHLORIDE 6 ML: 2.5 INJECTION, SOLUTION EPIDURAL; INFILTRATION; INTRACAUDAL; PERINEURAL at 09:51

## 2024-06-21 RX ADMIN — Medication 12 ML/HR: at 09:55

## 2024-06-21 RX ADMIN — SODIUM CHLORIDE, POTASSIUM CHLORIDE, SODIUM LACTATE AND CALCIUM CHLORIDE: 600; 310; 30; 20 INJECTION, SOLUTION INTRAVENOUS at 07:33

## 2024-06-21 RX ADMIN — OXYTOCIN 1 MILLI-UNITS/MIN: 10 INJECTION, SOLUTION INTRAMUSCULAR; INTRAVENOUS at 07:34

## 2024-06-21 RX ADMIN — LIDOCAINE HYDROCHLORIDE AND EPINEPHRINE 3 ML: 15; 5 INJECTION, SOLUTION EPIDURAL at 09:47

## 2024-06-21 RX ADMIN — ACETAMINOPHEN 1000 MG: 500 TABLET ORAL at 10:23

## 2024-06-21 RX ADMIN — Medication 166.7 ML: at 14:54

## 2024-06-21 RX ADMIN — SODIUM CHLORIDE, POTASSIUM CHLORIDE, SODIUM LACTATE AND CALCIUM CHLORIDE 500 ML: 600; 310; 30; 20 INJECTION, SOLUTION INTRAVENOUS at 09:30

## 2024-06-21 NOTE — ANESTHESIA PROCEDURE NOTES
Epidural Block    Patient location during procedure: OB  Start time: 6/21/2024 9:42 AM  End time: 6/21/2024 9:47 AM  Reason for block: labor epidural  Staffing  Performed: resident/CRNA   Resident/CRNA: Jerardo Loredo APRN - CRNA  Performed by: Jerardo Loredo APRN - CRNA  Authorized by: Olegario Mortensen MD    Epidural  Patient position: sitting  Prep: ChloraPrep  Patient monitoring: continuous pulse ox and frequent blood pressure checks  Approach: midline  Location: L3-4  Injection technique: VY air  Provider prep: mask and sterile gloves  Needle  Needle type: Tuohy   Needle gauge: 17 G  Needle length: 3.5 in  Needle insertion depth: 7.5 cm  Catheter type: multi-orifice  Catheter size: 20 G  Catheter at skin depth: 13 cm  Test dose: negativeCatheter Secured: tegaderm and tape  Assessment  Hemodynamics: stable  Attempts: 1  Outcomes: uncomplicated and patient tolerated procedure well  Additional Notes  Confirmation with dry dural puncture with 27g spinal needle  Preanesthetic Checklist  Completed: patient identified, IV checked, site marked, risks and benefits discussed, surgical/procedural consents, equipment checked, pre-op evaluation, timeout performed, anesthesia consent given, oxygen available, monitors applied/VS acknowledged, fire risk safety assessment completed and verbalized and blood product R/B/A discussed and consented

## 2024-06-21 NOTE — PROGRESS NOTES
0623- Bedside shift change report given to Mahogany RN (oncoming nurse) by Vianey RN (offgoing nurse). Report included the following information Nurse Handoff Report, Adult Overview, Intake/Output, MAR, and Recent Results.     0718- verbal order from MD Tolliver to start pitocin that is ordered in MAR due to pt contractions spacing out     0935- CRNA [place] at bedside to discuss epidural placement     0942- time out     0947- test dose     1009- MD Lewis notified pt has a temperature of 99.0 and baby heart rate has been in the 170's. Verbal order for 1,000 of acetaminophen.     1039- MD Lewis at bedside to do SVE and Arom pt     1222- MD Lewis notified fo pt having variables with contractions. Reviewed strip and en route to do SVE on pt     1355- Bedside shift change report given to Hedy RN (oncoming nurse) by Mahogany RN (offgoing nurse). Report included the following information Nurse Handoff Report, Adult Overview, Intake/Output, MAR, and Recent Results.

## 2024-06-21 NOTE — L&D DELIVERY NOTE
Cheryl, Female Brylee [[id number]]      Labor Events     Labor: No  Cervical Ripening Date/Time:  24 17:15:00   Cervical Ripening Type: Misoprostol, Hartman/EASI  Antibiotics Received during Labor: No  Rupture Identifier: Sac 1  Rupture Date/Time:  24 10:45:00   Rupture Type: AROM  Fluid Color: Clear  Fluid Odor: None  Fluid Volume: Moderate  Induction: Cervical Ripening Balloon, Misoprostol  Augmentation: AROM  Labor Complications: None              Anesthesia    Method: Epidural       Labor Event Times      Labor onset date/time:  24 12:30:00     Dilation complete date/time:  24 14:00:00     Start pushing date/time:  2024 14:09:00   Decision date/time (emergent ):            Labor Length    1st stage: 1h 30m  2nd stage: 0h 49m  3rd stage: 0h 04m       Delivery Details      Delivery Date: 24 Delivery Time: 14:49:00   Delivery Type: Vaginal, Spontaneous               Presentation    Presentation: Vertex  Position: Right  _: Occiput  _: Anterior       Shoulder Dystocia    Shoulder Dystocia Present?: No       Assisted Delivery Details    Forceps Attempted?: No  Vacuum Extractor Attempted?: No                           Cord    Vessels: 3 Vessels  Complications: Wrapped  Delayed Cord Clamping?: Yes  Cord Clamped Date/Time: 2024 14:51:00  Cord Blood Disposition: Discard  Gases Sent?: No              Placenta    Date/Time: 2024 14:53:18  Removal: Spontaneous  Appearance: Intact  Disposition: Discarded       Lacerations    Episiotomy: None  Perineal Lacerations: 1st  Other Lacerations: no non-perineal laceration  Number of Repair Packets: 1       Vaginal Counts    Initial Count Personnel: KIEPER  Initial Count Verified By: DALTON  Intial Sponge Count: Correct Intial Needles Count: Correct Intial Instruments Count: Correct   Final Sponges Count: Correct Final Needles  Count: Correct Final Instruments Count: Correct   Final Count Personnel: KIEPER  Final Count

## 2024-06-21 NOTE — CARE COORDINATION
6/21/2024  12:23 PM    CM met with VIOLETTA to complete initial assessment and begin discharge planning.  MOB verified and confirmed demographics.  VIOLETTA lives with her grandmother- Paula Vásquez,  at the address on file. VIOLETTA is not employed and plans to be home with infant. VIOLETTA reports she has good family support, and feels like she has the support she needs when she returns home.  VIOLETTA plans to bottle feed baby.  [facility] for infant. VIOLETTA has car seat, bassinet/crib, clothing, bottles and all necessary supplies for baby. VIOLETTA has [employer], and will be adding baby to this policy. CM discussed process to add baby to insurance, VIOLETTA verbalized understanding.    CM reviewed VIOLETTA's current medication. VIOLETTA is taking Subutex 2-3mg/daily. VIOLETTA receives this treatment through [facility], Dr. Rucker.  VIOLETTA is not receiving counseling/therapy. MOB noted she has not needed these services.  VIOLETTA shared she has been on/off of Subutex for about 8 yrs.  VIOLETTA has another child that is 7yrs old and she placed that child up for adoption after birth.      MOB also positive for benzo/THC, VIOLETTA noted she was seen at [facility] about a week or so ago and was prescribed something for anxiety and noted that she only took that medication once.     CM discussed ADAN scoring and 5 day hospital stay with infant. CM advised that report would be made to CPS to determine any further resources for her/infant, if case is assigned.  MOB verbalized understanding.      CM will monitor after infant's birth for urine screen for infant.        06/21/24 1222   Service Assessment   Patient Orientation Alert and Oriented   Cognition Alert   History Provided By Patient   Primary Caregiver Self   Support Systems Family Members   PCP Verified by CM Yes   Last Visit to PCP Within last 3 months   Prior Functional Level Independent in ADLs/IADLs   Current Functional Level Independent in ADLs/IADLs   Can patient

## 2024-06-21 NOTE — ANESTHESIA PRE PROCEDURE
Department of Anesthesiology  Preprocedure Note       Name:  Brylee M McFadden   Age:  33 y.o.  :  1990                                          MRN:  [id number]         Date:  2024      Surgeon: * No surgeons listed *    Procedure: * No procedures listed *    Medications prior to admission:   Prior to Admission medications    Medication Sig Start Date End Date Taking? Authorizing Provider   amphetamine-dextroamphetamine (ADDERALL) 20 MG tablet Take 1 tablet by mouth 3 times daily.    Automatic Reconciliation, Ar   buprenorphine-naloxone (SUBOXONE) 8-2 MG FILM SL film Place 1 Film under the tongue daily.    Automatic Reconciliation, Ar       Current medications:    Current Facility-Administered Medications   Medication Dose Route Frequency Provider Last Rate Last Admin   • fentaNYL 2 mcg/mL BUPivacaine 0.125% in sodium chloride 0.9% 100 mL epidural infusion  12 mL/hr Epidural Continuous Sonny Loredoic R, APRN - CRNA 12 mL/hr at 24 0955 12 mL/hr at 24 0955   • naloxone 0.4 mg in 10 mL sodium chloride syringe   IntraVENous PRN Facundo Jerardo R, APRN - CRNA       • ePHEDrine injection 10 mg  10 mg IntraVENous Q5 Min PRN Facundo Jerardo R, APRN - CRNA       • lactated ringers IV soln infusion   IntraVENous Continuous Myrna Potter  mL/hr at 24 0733 New Bag at 24 0733   • lactated ringers bolus 500 mL  500 mL IntraVENous PRN Myrna Potter MD        Or   • lactated ringers bolus 500 mL  500 mL IntraVENous PRN Myrna Potter MD       • sodium chloride flush 0.9 % injection 5-40 mL  5-40 mL IntraVENous 2 times per day Myrna Potter MD       • sodium chloride flush 0.9 % injection 5-40 mL  5-40 mL IntraVENous PRN Myrna Potter MD       • 0.9 % sodium chloride infusion  25 mL IntraVENous PRN Myrna Potter MD       • methylergonovine (METHERGINE) injection 200 mcg  200 mcg IntraMUSCular PRN Myrna Potter MD

## 2024-06-21 NOTE — PROGRESS NOTES
Labor Progress Note  Patient seen, fetal heart rate and contraction pattern evaluated, patient examined.  Comfortable with epidural.       Vitals:    24 1139   BP:    Pulse:    Resp:    Temp:    SpO2: 100%       Physical Exam:  Cervical Exam:  7 cm dilated    80% effaced    -2 station      Fetal Heart Rate: Baseline: 170 per minute  Variability: moderate  Accelerations: yes  Decelerations: variables  CTX: q 2-3 mins  Pit at 2    Assessment/Plan:      39w1d eIOL    //eIOL  - GBS neg  - Epidural  - S/p cook/miso.   - AROM - clear  - Continue to titrate Pit  - SVE /-2    - Fetal tachycardia now resolved.    //HCV   - VL 567K at 3T labs  - AST/ALT 42/53    //Opioid use   - subutex 2-3 mg daily ([facility], Dr. Rucker).   - UDS + benzo/THC.     //ITP   - no tx during pregnancy  - Plts 124.

## 2024-06-21 NOTE — PROGRESS NOTES
1355- Bedside shift change report given to Hedy RN (oncoming nurse) by Mahogany RN (offgoing nurse). Report included the following information Nurse Handoff Report, Adult Overview, Intake/Output, MAR, and Recent Results.     1400: Dr Lewis at bedside. SVE per MD 10/100/0. Per MD, patient can start pushing.    1409: Patient actively pushing.  RN remains in continuous attendance at the bedside.  Assessment & evaluation of fetal heart rate ongoing via continuous EFM.    1449: RN and MD remained at bedside throughout pushing.  EFM continuously assessed.  Vaginal delivery of viable female infant.    1900: Bedside shift change report given postpartum RN (oncoming nurse) by Robles RN  (offgoing nurse). Report included the following information Nurse Handoff Report, Adult Overview, Intake/Output, and Recent Results.

## 2024-06-21 NOTE — PROGRESS NOTES
Labor Progress Note  Patient seen, fetal heart rate and contraction pattern evaluated, patient examined.  Comfortable with epidural.   Denies fever/chills.     Vitals:    24 1006   BP: 114/75   Pulse: (!) 104   Resp:    Temp:    SpO2:    Temp 99.0      Physical Exam:  Cervical Exam:  5 cm dilated    60% effaced    -3 station    Membranes:  Artificial Rupture of Membranes; Amniotic Fluid: medium amount of clear fluid  Fetal Heart Rate: Baseline: 170 per minute  Variability: moderate  Accelerations: yes  Decelerations: none  CTX: q mins  Pit at 2    Assessment/Plan:      39w1d eIOL    //eIOL  - GBS neg  - Epidural  - S/p cook/miso.   - AROM - clear  - Continue to titrate Pit  - SVE /-3    - Temp 99.0. Mild fetal Tachy after epidural. S/p IVFB. Will give tylenol 1000 mg. CTM.    //HCV   - VL 567K at 3T labs  - AST/ALT 42/53    //Opioid use   - subutex 2-3 mg daily ([facility], Dr. Rucker).   - UDS + benzo/THC.     //ITP   - no tx during pregnancy  - Plts 124.

## 2024-06-22 VITALS
WEIGHT: 257 LBS | TEMPERATURE: 98 F | DIASTOLIC BLOOD PRESSURE: 76 MMHG | RESPIRATION RATE: 18 BRPM | OXYGEN SATURATION: 97 % | HEART RATE: 81 BPM | BODY MASS INDEX: 42.77 KG/M2 | SYSTOLIC BLOOD PRESSURE: 119 MMHG

## 2024-06-22 LAB
ALBUMIN SERPL-MCNC: 2.6 G/DL (ref 3.5–5)
ALBUMIN/GLOB SERPL: 0.6 (ref 1.1–2.2)
ALP SERPL-CCNC: 102 U/L (ref 45–117)
ALT SERPL-CCNC: 41 U/L (ref 12–78)
ANION GAP SERPL CALC-SCNC: 6 MMOL/L (ref 5–15)
AST SERPL-CCNC: 34 U/L (ref 15–37)
BILIRUB SERPL-MCNC: 1.2 MG/DL (ref 0.2–1)
BUN SERPL-MCNC: 3 MG/DL (ref 6–20)
BUN/CREAT SERPL: 4 (ref 12–20)
CALCIUM SERPL-MCNC: 9.1 MG/DL (ref 8.5–10.1)
CHLORIDE SERPL-SCNC: 108 MMOL/L (ref 97–108)
CO2 SERPL-SCNC: 24 MMOL/L (ref 21–32)
CREAT SERPL-MCNC: 0.68 MG/DL (ref 0.55–1.02)
GLOBULIN SER CALC-MCNC: 4.3 G/DL (ref 2–4)
GLUCOSE SERPL-MCNC: 92 MG/DL (ref 65–100)
POTASSIUM SERPL-SCNC: 3.8 MMOL/L (ref 3.5–5.1)
PROT SERPL-MCNC: 6.9 G/DL (ref 6.4–8.2)
SODIUM SERPL-SCNC: 138 MMOL/L (ref 136–145)

## 2024-06-22 PROCEDURE — 80053 COMPREHEN METABOLIC PANEL: CPT

## 2024-06-22 PROCEDURE — 36415 COLL VENOUS BLD VENIPUNCTURE: CPT

## 2024-06-22 PROCEDURE — 1120000000 HC RM PRIVATE OB

## 2024-06-22 PROCEDURE — 6370000000 HC RX 637 (ALT 250 FOR IP): Performed by: OBSTETRICS & GYNECOLOGY

## 2024-06-22 RX ADMIN — IBUPROFEN 800 MG: 800 TABLET, FILM COATED ORAL at 08:08

## 2024-06-22 RX ADMIN — IBUPROFEN 800 MG: 800 TABLET, FILM COATED ORAL at 16:07

## 2024-06-22 ASSESSMENT — PAIN DESCRIPTION - ORIENTATION
ORIENTATION: ANTERIOR

## 2024-06-22 ASSESSMENT — PAIN SCALES - GENERAL
PAINLEVEL_OUTOF10: 6
PAINLEVEL_OUTOF10: 4

## 2024-06-22 ASSESSMENT — PAIN DESCRIPTION - DESCRIPTORS
DESCRIPTORS: ACHING;CRAMPING
DESCRIPTORS: ACHING;CRAMPING
DESCRIPTORS: ACHING

## 2024-06-22 ASSESSMENT — PAIN DESCRIPTION - ONSET: ONSET: ON-GOING

## 2024-06-22 ASSESSMENT — PAIN DESCRIPTION - LOCATION
LOCATION: ABDOMEN

## 2024-06-22 ASSESSMENT — PAIN - FUNCTIONAL ASSESSMENT: PAIN_FUNCTIONAL_ASSESSMENT: ACTIVITIES ARE NOT PREVENTED

## 2024-06-22 ASSESSMENT — PAIN DESCRIPTION - PAIN TYPE: TYPE: ACUTE PAIN

## 2024-06-22 NOTE — DISCHARGE SUMMARY
Obstetrical Discharge Summary     Name: Brylee M McFadden MRN: [id number]  SSN: [id number]    YOB: 1990  Age: 33 y.o.  Sex: female      Admit Date: 2024    Discharge Date: 2024     Admitting Physician: Hannah Read MD     Attending Physician:  Hannah Read MD     Admission Diagnoses: Term pregnancy [Z34.90]    Discharge Diagnoses:   Information for the patient's :  McFadden, Female Brylee [[id number]]   @[id number]@      Additional Diagnoses:  No components found for: \"OBEXTABORH\", \"OBEXTABSCRN\", \"OBEXTRUBELLA\", \"OBEXTGRBS\"    Hospital Course: Normal hospital course following the delivery. Today, patient with no complaints.Tolerating diet. Normal Lochia. Pain controlled. Ambulating. Voiding. Denies fever, chills, CP, SOB, calf pain/tenderness.     Vitals:    24 0825   BP: 122/73   Pulse: 80   Resp: 18   Temp: 98 °F (36.7 °C)   SpO2: 98%       Exam:   Patient without distress.                  Fundus firm, nontender per nursing fundal checks.                Perineum with normal lochia noted per nursing assessment.                Lower extremities are negative for pathological edema.      Patient Instructions:   Current Discharge Medication List        START taking these medications    Details   acetaminophen (TYLENOL) 500 MG tablet Take 2 tablets by mouth every 8 hours as needed for Pain  Qty: 180 tablet, Refills: 0      docusate sodium (COLACE) 100 MG capsule Take 1 capsule by mouth 2 times daily  Qty: 60 capsule, Refills: 0      ibuprofen (ADVIL;MOTRIN) 800 MG tablet Take 1 tablet by mouth every 8 hours as needed for Pain  Qty: 30 tablet, Refills: 1           CONTINUE these medications which have NOT CHANGED    Details   amphetamine-dextroamphetamine (ADDERALL) 20 MG tablet Take 1 tablet by mouth 3 times daily.      buprenorphine-naloxone (SUBOXONE) 8-2 MG FILM SL film Place 1 Film under the tongue daily.             Disposition at Discharge: Home or self care    Condition at

## 2024-06-23 PROCEDURE — 6370000000 HC RX 637 (ALT 250 FOR IP): Performed by: OBSTETRICS & GYNECOLOGY

## 2024-06-23 PROCEDURE — 94761 N-INVAS EAR/PLS OXIMETRY MLT: CPT

## 2024-06-23 RX ADMIN — IBUPROFEN 800 MG: 800 TABLET, FILM COATED ORAL at 00:12

## 2024-06-23 RX ADMIN — IBUPROFEN 800 MG: 800 TABLET, FILM COATED ORAL at 09:02

## 2024-06-23 ASSESSMENT — PAIN DESCRIPTION - DESCRIPTORS: DESCRIPTORS: CRAMPING

## 2024-06-23 ASSESSMENT — PAIN SCALES - GENERAL
PAINLEVEL_OUTOF10: 0
PAINLEVEL_OUTOF10: 3

## 2024-06-23 ASSESSMENT — PAIN DESCRIPTION - LOCATION: LOCATION: ABDOMEN

## 2024-06-23 NOTE — PROGRESS NOTES
Post-Partum Day Number 2 Progress Note    Brylee M McFadden     Assessment: Doing well, post partum day 2    Plan:   - Discharge home today  - Follow up in office in 6 week(s) with [facility].  - Pain medication prescription(s) sent.  - Questions answered.    Information for the patient's :  Cheryl Female Brylee [[id number]]   Vaginal, Spontaneous  Patient doing well without significant complaint.  Voiding without difficulty, normal lochia. Ready for discharge home.    Vitals:  /76   Pulse 81   Temp 98 °F (36.7 °C) (Oral)   Resp 18   Wt 116.6 kg (257 lb)   LMP 2023   SpO2 97%   Breastfeeding Unknown   BMI 42.77 kg/m²   Temp (24hrs), Av.8 °F (36.6 °C), Min:97.5 °F (36.4 °C), Max:98 °F (36.7 °C)      Exam:        Patient without distress.                Fundus firm, nontender per nursing fundal checks                Perineum with normal lochia noted per nursing assessment    Labs:     Lab Results   Component Value Date/Time    WBC 6.8 2024 05:00 PM    WBC 6.9 2024 02:41 PM    WBC 6.3 10/23/2020 03:30 PM    HGB 11.8 2024 05:00 PM    HGB 11.5 2024 02:41 PM    HGB 13.1 10/23/2020 03:30 PM    HCT 33.0 2024 05:00 PM    HCT 35.4 2024 02:41 PM    HCT 36.8 10/23/2020 03:30 PM     2024 05:00 PM     2024 02:41 PM     10/23/2020 03:30 PM       No results found for this or any previous visit (from the past 24 hour(s)).

## 2024-06-24 LAB — T PALLIDUM AB SER QL IA: NON REACTIVE

## 2024-06-25 NOTE — ANESTHESIA POSTPROCEDURE EVALUATION
Department of Anesthesiology  Postprocedure Note    Patient: Brylee M McFadden  MRN: [id number]  YOB: 1990  Date of evaluation: 6/25/2024    Procedure Summary       Date: 06/21/24 Room / Location:     Anesthesia Start: 0929 Anesthesia Stop: 1449    Procedure: Labor Analgesia Diagnosis:     Scheduled Providers:  Responsible Provider: Olegario Mortensen MD    Anesthesia Type: epidural ASA Status: 2            Anesthesia Type: No value filed.    Alex Phase I:      Alex Phase II:      Anesthesia Post Evaluation    Patient location during evaluation: PACU  Patient participation: complete - patient participated  Level of consciousness: awake  Pain score: 0  Airway patency: patent  Nausea & Vomiting: no nausea and no vomiting  Cardiovascular status: blood pressure returned to baseline  Respiratory status: acceptable  Hydration status: euvolemic  Multimodal analgesia pain management approach  Pain management: adequate    No notable events documented.

## 2024-06-27 ENCOUNTER — TELEPHONE (OUTPATIENT)
Facility: CLINIC | Age: 34
End: 2024-06-27

## 2024-06-27 NOTE — TELEPHONE ENCOUNTER
Care Transitions Initial Follow Up Call    Outreach made within 2 business days of discharge: Yes    Patient: Brylee M McFadden Patient : 1990   MRN: [id number]  Reason for Admission: There are no discharge diagnoses documented for the most recent discharge.  Discharge Date: 24       Spoke with: LVM    Scheduled appointment with PCP within 7-14 days    Follow Up  No future appointments.    Nicole Duran MA

## 2025-05-27 ENCOUNTER — CLINICAL DOCUMENTATION (OUTPATIENT)
Age: 35
End: 2025-05-27

## 2025-05-27 NOTE — PROGRESS NOTES
NP referral. Patient seen by Dr. Shashi Sneed MD Dx. Hep C Records in [facility]. Forwarded to Lynsey for review.   
Name band;

## 2025-06-09 ENCOUNTER — CLINICAL DOCUMENTATION (OUTPATIENT)
Age: 35
End: 2025-06-09

## 2025-06-09 NOTE — PROGRESS NOTES
Attempted to contact patient to schedule appt. MB full; unable to leave message.  NP referral. Patient seen by Dr. Shashi Sneed MD Dx. Hep C Records in [facility]. Dx: Hep C. Routine appt, SQ

## 2025-06-26 ENCOUNTER — TELEPHONE (OUTPATIENT)
Age: 35
End: 2025-06-26

## 2025-06-26 NOTE — TELEPHONE ENCOUNTER
----- Message from CLARICE MYLES LPN sent at 5/29/2025  6:37 AM EDT -----  Regarding: RE: NP appt  Dx: Hep C. Routine appt  ----- Message -----  From: Ariadna Le  Sent: 5/27/2025  11:28 AM EDT  To: Lynsey Infante LPN  Subject: NP appt                                          NP referral. Patient seen by Dr. Shashi Sneed MD Dx. Hep C Records in [facility]. Forwarded to Lynsey for review.     Thanks